# Patient Record
Sex: FEMALE | Race: WHITE | Employment: OTHER | URBAN - METROPOLITAN AREA
[De-identification: names, ages, dates, MRNs, and addresses within clinical notes are randomized per-mention and may not be internally consistent; named-entity substitution may affect disease eponyms.]

---

## 2021-05-02 ENCOUNTER — HOSPITAL ENCOUNTER (EMERGENCY)
Age: 86
Discharge: HOME | End: 2021-05-02
Attending: EMERGENCY MEDICINE
Payer: MEDICARE

## 2021-05-02 ENCOUNTER — APPOINTMENT (OUTPATIENT)
Dept: GENERAL RADIOLOGY | Age: 86
End: 2021-05-02
Attending: PHYSICIAN ASSISTANT
Payer: MEDICARE

## 2021-05-02 VITALS
WEIGHT: 154 LBS | HEART RATE: 78 BPM | TEMPERATURE: 98.6 F | OXYGEN SATURATION: 98 % | SYSTOLIC BLOOD PRESSURE: 134 MMHG | BODY MASS INDEX: 22.81 KG/M2 | HEIGHT: 69 IN | RESPIRATION RATE: 16 BRPM | DIASTOLIC BLOOD PRESSURE: 78 MMHG

## 2021-05-02 DIAGNOSIS — S82.851A CLOSED TRIMALLEOLAR FRACTURE OF RIGHT ANKLE, INITIAL ENCOUNTER: Primary | ICD-10-CM

## 2021-05-02 PROCEDURE — 75810000053 HC SPLINT APPLICATION

## 2021-05-02 PROCEDURE — 99285 EMERGENCY DEPT VISIT HI MDM: CPT

## 2021-05-02 PROCEDURE — 2709999900 HC NON-CHARGEABLE SUPPLY

## 2021-05-02 PROCEDURE — 73610 X-RAY EXAM OF ANKLE: CPT

## 2021-05-02 RX ORDER — ASPIRIN 81 MG/1
81 TABLET ORAL DAILY
COMMUNITY

## 2021-05-02 RX ORDER — TRAMADOL HYDROCHLORIDE 50 MG/1
50 TABLET ORAL
Qty: 12 TAB | Refills: 0 | Status: SHIPPED | OUTPATIENT
Start: 2021-05-02 | End: 2021-05-05

## 2021-05-02 RX ORDER — LISINOPRIL 10 MG/1
2.5 TABLET ORAL DAILY
COMMUNITY

## 2021-05-02 NOTE — ED PROVIDER NOTES
EMERGENCY DEPARTMENT HISTORY AND PHYSICAL EXAM    Date: 2021  Patient Name: Meryle Nett    History of Presenting Illness     Chief Complaint   Patient presents with    Fall    Ankle Injury         History Provided By: Patient    Chief Complaint: ankle injury         Additional History (Context):   12:34 PM  Meryle Nett is a 80 y.o. female presents to the emergency department C/O right ankle injury after she tripped down one step. She did not hit her head. She is having right ankle swelling. No other injuries during the fall. PCP: Anna Vickers MD        Past History     Past Medical History:  Past Medical History:   Diagnosis Date    Hypertension        Past Surgical History:  Past Surgical History:   Procedure Laterality Date    HX APPENDECTOMY  1950    HX CHOLECYSTECTOMY  200       Family History:  History reviewed. No pertinent family history. Social History:  Social History     Tobacco Use    Smoking status: Former Smoker     Quit date: 2000     Years since quittin.3    Smokeless tobacco: Never Used   Substance Use Topics    Alcohol use: Never     Frequency: Never    Drug use: Never       Allergies: Allergies   Allergen Reactions    Penicillins Unknown (comments)       Review of Systems   Review of Systems   Constitutional: Negative for chills and fever. Respiratory: Negative for shortness of breath. Cardiovascular: Negative for chest pain. Gastrointestinal: Negative for abdominal pain, diarrhea, nausea and vomiting. Musculoskeletal: Positive for arthralgias (right ankle). Neurological: Negative for weakness and numbness. All other systems reviewed and are negative. Physical Exam     Vitals:    21 1237 21 1335   BP: (!) 153/80    Pulse: 80    Resp: 18    Temp: 98 °F (36.7 °C)    SpO2: 98% 98%   Weight: 69.9 kg (154 lb)    Height: 5' 9\" (1.753 m)      Physical Exam  Vitals signs and nursing note reviewed.    Constitutional: Appearance: She is well-developed. HENT:      Head: Normocephalic and atraumatic. Neck:      Musculoskeletal: Normal range of motion and neck supple. Cardiovascular:      Rate and Rhythm: Normal rate and regular rhythm. Heart sounds: Normal heart sounds. No murmur. Pulmonary:      Effort: Pulmonary effort is normal. No respiratory distress. Breath sounds: Normal breath sounds. No wheezing or rales. Abdominal:      General: Bowel sounds are normal.      Palpations: Abdomen is soft. Tenderness: There is no abdominal tenderness. Musculoskeletal:      Right ankle: She exhibits decreased range of motion, swelling and ecchymosis. She exhibits no deformity. Tenderness. Lateral malleolus and medial malleolus tenderness found. No head of 5th metatarsal and no proximal fibula tenderness found. Comments: Swelling tenderness and bruising to the right ankle, right foot nontender to palpation, pulses 2+ for DP and PT, no tenderness along the proximal fibula   Neurological:      Mental Status: She is alert and oriented to person, place, and time. Psychiatric:         Judgment: Judgment normal.         Diagnostic Study Results     Labs:   No results found for this or any previous visit (from the past 12 hour(s)). Radiologic Studies:   XR ANKLE RT MIN 3 V   Final Result      1. Fractures of the medial malleolus and distal fibula, likely a trimalleolar   fracture. CT Results  (Last 48 hours)    None        CXR Results  (Last 48 hours)    None          Medical Decision Making   I am the first provider for this patient. I reviewed the vital signs, available nursing notes, past medical history, past surgical history, family history and social history. Vital Signs: Reviewed the patient's vital signs. Pulse Oximetry Analysis: 98% on RA     Records Reviewed: Nursing Notes and Old Medical Records    Procedures:  Procedures    ED Course:   12:34 PM Initial assessment performed.  The patients presenting problems have been discussed, and they are in agreement with the care plan formulated and outlined with them. I have encouraged them to ask questions as they arise throughout their visit.    2:02 PM  Case discussed with ortho, Dr. Roz Alfonso, recommends posterior and stirrup splint and follow-up with him in office    Patient will return to assisted living where they will let her use a wheelchair and prescription was written to have PT fit patient for wheelchair    Discussion:  Pt presents with right ankle pain after a fall. Patient is neurovascularly intact. No other injuries from the fall. No head injury. Trimalleolar fracture seen on x-ray. Patient placed in anterior and stirrup splint and given wheelchair told to follow-up with Ortho. Strict return precautions given, pt offering no questions or complaints. Diagnosis and Disposition     DISCHARGE NOTE:  Nasrin Hoskins  results have been reviewed with her. She has been counseled regarding her diagnosis, treatment, and plan. She verbally conveys understanding and agreement of the signs, symptoms, diagnosis, treatment and prognosis and additionally agrees to follow up as discussed. She also agrees with the care-plan and conveys that all of her questions have been answered. I have also provided discharge instructions for her that include: educational information regarding their diagnosis and treatment, and list of reasons why they would want to return to the ED prior to their follow-up appointment, should her condition change. She has been provided with education for proper emergency department utilization. CLINICAL IMPRESSION:    1. Closed trimalleolar fracture of right ankle, initial encounter        PLAN:  1. D/C Home  2. Current Discharge Medication List      START taking these medications    Details   traMADoL (Ultram) 50 mg tablet Take 1 Tab by mouth every six (6) hours as needed for Pain for up to 3 days.  Max Daily Amount: 200 mg. Qty: 12 Tab, Refills: 0    Associated Diagnoses: Closed trimalleolar fracture of right ankle, initial encounter      Wheel Chair joel Please have PT evaluate for wheelchair, no weightbearing until evaluated by Ortho  Qty: 1 Each, Refills: 0           3. Follow-up Information     Follow up With Specialties Details Why Contact Info    Latesha Hassan MD Orthopedic Surgery Schedule an appointment as soon as possible for a visit   Archana        [facility] EMERGENCY DEPT Emergency Medicine  If symptoms worsen [address] [place] [id number] [phone]               Please note that this dictation was completed with [employer], the computer voice recognition software. Quite often unanticipated grammatical, syntax, homophones, and other interpretive errors are inadvertently transcribed by the computer software. Please disregard these errors. Please excuse any errors that have escaped final proofreading.

## 2021-05-02 NOTE — ED TRIAGE NOTES
Pt arrives via ambulance c/o trip and fall down 1 step. Pt c/o R ankle pain s/p fall. Pt denies any syncope or other complaints. Pt has swelling to R ankle. + pulses limited ROM due to pain.

## 2021-05-06 ENCOUNTER — TRANSCRIBE ORDER (OUTPATIENT)
Dept: REGISTRATION | Age: 86
End: 2021-05-06

## 2021-05-06 ENCOUNTER — HOSPITAL ENCOUNTER (OUTPATIENT)
Dept: PREADMISSION TESTING | Age: 86
Discharge: HOME | End: 2021-05-06
Payer: MEDICARE

## 2021-05-06 DIAGNOSIS — S82.851A CLOSED DISPLACED TRIMALLEOLAR FRACTURE OF RIGHT ANKLE: Primary | ICD-10-CM

## 2021-05-06 DIAGNOSIS — S82.851A CLOSED DISPLACED TRIMALLEOLAR FRACTURE OF RIGHT ANKLE: ICD-10-CM

## 2021-05-06 LAB
ALBUMIN SERPL-MCNC: 3.5 G/DL (ref 3.4–5)
ALBUMIN/GLOB SERPL: 1 {RATIO} (ref 0.8–1.7)
ALP SERPL-CCNC: 82 U/L (ref 45–117)
ALT SERPL-CCNC: 18 U/L (ref 13–56)
ANION GAP SERPL CALC-SCNC: 3 MMOL/L (ref 3–18)
AST SERPL-CCNC: 17 U/L (ref 10–38)
ATRIAL RATE: 68 BPM
BILIRUB SERPL-MCNC: 0.6 MG/DL (ref 0.2–1)
BUN SERPL-MCNC: 25 MG/DL (ref 7–18)
BUN/CREAT SERPL: 22 (ref 12–20)
CALCIUM SERPL-MCNC: 8.6 MG/DL (ref 8.5–10.1)
CALCULATED P AXIS, ECG09: 58 DEGREES
CALCULATED R AXIS, ECG10: -35 DEGREES
CALCULATED T AXIS, ECG11: 54 DEGREES
CHLORIDE SERPL-SCNC: 103 MMOL/L (ref 100–111)
CO2 SERPL-SCNC: 31 MMOL/L (ref 21–32)
CREAT SERPL-MCNC: 1.16 MG/DL (ref 0.6–1.3)
DIAGNOSIS, 93000: NORMAL
ERYTHROCYTE [DISTWIDTH] IN BLOOD BY AUTOMATED COUNT: 12.8 % (ref 11.6–14.5)
GLOBULIN SER CALC-MCNC: 3.5 G/DL (ref 2–4)
GLUCOSE SERPL-MCNC: 93 MG/DL (ref 74–99)
HCT VFR BLD AUTO: 37.2 % (ref 35–45)
HGB BLD-MCNC: 12.2 G/DL (ref 12–16)
MCH RBC QN AUTO: 30.1 PG (ref 24–34)
MCHC RBC AUTO-ENTMCNC: 32.8 G/DL (ref 31–37)
MCV RBC AUTO: 91.9 FL (ref 74–97)
P-R INTERVAL, ECG05: 196 MS
PLATELET # BLD AUTO: 238 K/UL (ref 135–420)
PMV BLD AUTO: 10.7 FL (ref 9.2–11.8)
POTASSIUM SERPL-SCNC: 3.7 MMOL/L (ref 3.5–5.5)
PROT SERPL-MCNC: 7 G/DL (ref 6.4–8.2)
Q-T INTERVAL, ECG07: 412 MS
QRS DURATION, ECG06: 110 MS
QTC CALCULATION (BEZET), ECG08: 438 MS
RBC # BLD AUTO: 4.05 M/UL (ref 4.2–5.3)
SODIUM SERPL-SCNC: 137 MMOL/L (ref 136–145)
VENTRICULAR RATE, ECG03: 68 BPM
WBC # BLD AUTO: 8.7 K/UL (ref 4.6–13.2)

## 2021-05-06 PROCEDURE — 85027 COMPLETE CBC AUTOMATED: CPT

## 2021-05-06 PROCEDURE — 83036 HEMOGLOBIN GLYCOSYLATED A1C: CPT

## 2021-05-06 PROCEDURE — 80053 COMPREHEN METABOLIC PANEL: CPT

## 2021-05-06 PROCEDURE — 93005 ELECTROCARDIOGRAM TRACING: CPT

## 2021-05-06 PROCEDURE — 36415 COLL VENOUS BLD VENIPUNCTURE: CPT

## 2021-05-11 LAB
EST. AVERAGE GLUCOSE BLD GHB EST-MCNC: 123 MG/DL
HBA1C MFR BLD: 5.9 % (ref 4.2–5.6)

## 2021-05-13 ENCOUNTER — ANESTHESIA EVENT (OUTPATIENT)
Dept: SURGERY | Age: 86
End: 2021-05-13
Payer: MEDICARE

## 2021-05-13 RX ORDER — INSULIN LISPRO 100 [IU]/ML
INJECTION, SOLUTION INTRAVENOUS; SUBCUTANEOUS ONCE
Status: CANCELLED | OUTPATIENT
Start: 2021-05-13 | End: 2021-05-14

## 2021-05-14 ENCOUNTER — APPOINTMENT (OUTPATIENT)
Dept: GENERAL RADIOLOGY | Age: 86
End: 2021-05-14
Attending: ORTHOPAEDIC SURGERY
Payer: MEDICARE

## 2021-05-14 ENCOUNTER — HOSPITAL ENCOUNTER (OUTPATIENT)
Age: 86
Discharge: HOME HEALTH | End: 2021-05-15
Attending: ORTHOPAEDIC SURGERY | Admitting: ORTHOPAEDIC SURGERY
Payer: MEDICARE

## 2021-05-14 ENCOUNTER — ANESTHESIA (OUTPATIENT)
Dept: SURGERY | Age: 86
End: 2021-05-14
Payer: MEDICARE

## 2021-05-14 DIAGNOSIS — S82.851A CLOSED TRIMALLEOLAR FRACTURE OF RIGHT ANKLE, INITIAL ENCOUNTER: Primary | ICD-10-CM

## 2021-05-14 LAB
GLUCOSE BLD STRIP.AUTO-MCNC: 132 MG/DL (ref 70–110)
GLUCOSE BLD STRIP.AUTO-MCNC: 98 MG/DL (ref 70–110)

## 2021-05-14 PROCEDURE — C1713 ANCHOR/SCREW BN/BN,TIS/BN: HCPCS | Performed by: ORTHOPAEDIC SURGERY

## 2021-05-14 PROCEDURE — 77030013079 HC BLNKT BAIR HGGR 3M -A: Performed by: ANESTHESIOLOGY

## 2021-05-14 PROCEDURE — 74011250636 HC RX REV CODE- 250/636: Performed by: ANESTHESIOLOGY

## 2021-05-14 PROCEDURE — 82962 GLUCOSE BLOOD TEST: CPT

## 2021-05-14 PROCEDURE — 76942 ECHO GUIDE FOR BIOPSY: CPT | Performed by: ORTHOPAEDIC SURGERY

## 2021-05-14 PROCEDURE — 74011000250 HC RX REV CODE- 250: Performed by: REGISTERED NURSE

## 2021-05-14 PROCEDURE — 76060000033 HC ANESTHESIA 1 TO 1.5 HR: Performed by: ORTHOPAEDIC SURGERY

## 2021-05-14 PROCEDURE — 77030020268 HC MISC GENERAL SUPPLY: Performed by: ORTHOPAEDIC SURGERY

## 2021-05-14 PROCEDURE — 64450 NJX AA&/STRD OTHER PN/BRANCH: CPT | Performed by: ANESTHESIOLOGY

## 2021-05-14 PROCEDURE — 74011250636 HC RX REV CODE- 250/636: Performed by: ORTHOPAEDIC SURGERY

## 2021-05-14 PROCEDURE — 74011000250 HC RX REV CODE- 250: Performed by: ORTHOPAEDIC SURGERY

## 2021-05-14 PROCEDURE — 77030012508 HC MSK AIRWY LMA AMBU -A: Performed by: ANESTHESIOLOGY

## 2021-05-14 PROCEDURE — 74011250637 HC RX REV CODE- 250/637: Performed by: ORTHOPAEDIC SURGERY

## 2021-05-14 PROCEDURE — 77030040361 HC SLV COMPR DVT MDII -B: Performed by: ORTHOPAEDIC SURGERY

## 2021-05-14 PROCEDURE — 77030000032 HC CUF TRNQT ZIMM -B: Performed by: ORTHOPAEDIC SURGERY

## 2021-05-14 PROCEDURE — 77030020269 HC MISC IMPL: Performed by: ORTHOPAEDIC SURGERY

## 2021-05-14 PROCEDURE — 77030002916 HC SUT ETHLN J&J -A: Performed by: ORTHOPAEDIC SURGERY

## 2021-05-14 PROCEDURE — 76210000006 HC OR PH I REC 0.5 TO 1 HR: Performed by: ORTHOPAEDIC SURGERY

## 2021-05-14 PROCEDURE — 77030016060 HC NDL NRV BLK TELE -A: Performed by: ANESTHESIOLOGY

## 2021-05-14 PROCEDURE — 77030020782 HC GWN BAIR PAWS FLX 3M -B: Performed by: ORTHOPAEDIC SURGERY

## 2021-05-14 PROCEDURE — 77030031139 HC SUT VCRL2 J&J -A: Performed by: ORTHOPAEDIC SURGERY

## 2021-05-14 PROCEDURE — 64447 NJX AA&/STRD FEMORAL NRV IMG: CPT | Performed by: ANESTHESIOLOGY

## 2021-05-14 PROCEDURE — 2709999900 HC NON-CHARGEABLE SUPPLY: Performed by: ORTHOPAEDIC SURGERY

## 2021-05-14 PROCEDURE — 74011250636 HC RX REV CODE- 250/636: Performed by: REGISTERED NURSE

## 2021-05-14 PROCEDURE — 74011000250 HC RX REV CODE- 250: Performed by: ANESTHESIOLOGY

## 2021-05-14 PROCEDURE — 77030003748: Performed by: ORTHOPAEDIC SURGERY

## 2021-05-14 PROCEDURE — 64445 NJX AA&/STRD SCIATIC NRV IMG: CPT | Performed by: ANESTHESIOLOGY

## 2021-05-14 PROCEDURE — 76010000149 HC OR TIME 1 TO 1.5 HR: Performed by: ORTHOPAEDIC SURGERY

## 2021-05-14 DEVICE — SCREW BNE L16MM DIA2.7MM CORT ANK S STL NONLOCKING LO PROF: Type: IMPLANTABLE DEVICE | Site: ANKLE | Status: FUNCTIONAL

## 2021-05-14 DEVICE — SCREW BNE L40MM DIA4MM CANC FT ANK S STL ST CANN NONLOCKING: Type: IMPLANTABLE DEVICE | Site: ANKLE | Status: FUNCTIONAL

## 2021-05-14 DEVICE — IMPLANTABLE DEVICE: Type: IMPLANTABLE DEVICE | Site: ANKLE | Status: FUNCTIONAL

## 2021-05-14 RX ORDER — GLYCOPYRROLATE 0.2 MG/ML
INJECTION INTRAMUSCULAR; INTRAVENOUS AS NEEDED
Status: DISCONTINUED | OUTPATIENT
Start: 2021-05-14 | End: 2021-05-14 | Stop reason: HOSPADM

## 2021-05-14 RX ORDER — GLYCOPYRROLATE 0.2 MG/ML
INJECTION INTRAMUSCULAR; INTRAVENOUS AS NEEDED
Status: DISCONTINUED | OUTPATIENT
Start: 2021-05-14 | End: 2021-05-14

## 2021-05-14 RX ORDER — DEXTROSE 50 % IN WATER (D50W) INTRAVENOUS SYRINGE
25-50 AS NEEDED
Status: DISCONTINUED | OUTPATIENT
Start: 2021-05-14 | End: 2021-05-14 | Stop reason: SDUPTHER

## 2021-05-14 RX ORDER — CLINDAMYCIN PHOSPHATE 900 MG/50ML
900 INJECTION, SOLUTION INTRAVENOUS
Status: COMPLETED | OUTPATIENT
Start: 2021-05-14 | End: 2021-05-14

## 2021-05-14 RX ORDER — OXYCODONE AND ACETAMINOPHEN 5; 325 MG/1; MG/1
1 TABLET ORAL
Status: DISCONTINUED | OUTPATIENT
Start: 2021-05-14 | End: 2021-05-15 | Stop reason: HOSPADM

## 2021-05-14 RX ORDER — SODIUM CHLORIDE, SODIUM LACTATE, POTASSIUM CHLORIDE, CALCIUM CHLORIDE 600; 310; 30; 20 MG/100ML; MG/100ML; MG/100ML; MG/100ML
125 INJECTION, SOLUTION INTRAVENOUS CONTINUOUS
Status: DISCONTINUED | OUTPATIENT
Start: 2021-05-14 | End: 2021-05-14 | Stop reason: SDUPTHER

## 2021-05-14 RX ORDER — FENTANYL CITRATE 50 UG/ML
25 INJECTION, SOLUTION INTRAMUSCULAR; INTRAVENOUS AS NEEDED
Status: DISCONTINUED | OUTPATIENT
Start: 2021-05-14 | End: 2021-05-14 | Stop reason: HOSPADM

## 2021-05-14 RX ORDER — NALOXONE HYDROCHLORIDE 0.4 MG/ML
0.4 INJECTION, SOLUTION INTRAMUSCULAR; INTRAVENOUS; SUBCUTANEOUS AS NEEDED
Status: DISCONTINUED | OUTPATIENT
Start: 2021-05-14 | End: 2021-05-15 | Stop reason: HOSPADM

## 2021-05-14 RX ORDER — CLINDAMYCIN PHOSPHATE 900 MG/50ML
900 INJECTION, SOLUTION INTRAVENOUS ONCE
Status: DISPENSED | OUTPATIENT
Start: 2021-05-14 | End: 2021-05-14

## 2021-05-14 RX ORDER — SODIUM CHLORIDE, SODIUM LACTATE, POTASSIUM CHLORIDE, CALCIUM CHLORIDE 600; 310; 30; 20 MG/100ML; MG/100ML; MG/100ML; MG/100ML
125 INJECTION, SOLUTION INTRAVENOUS CONTINUOUS
Status: DISCONTINUED | OUTPATIENT
Start: 2021-05-14 | End: 2021-05-14 | Stop reason: HOSPADM

## 2021-05-14 RX ORDER — OXYCODONE HYDROCHLORIDE 5 MG/1
5 TABLET ORAL AS NEEDED
Status: DISCONTINUED | OUTPATIENT
Start: 2021-05-14 | End: 2021-05-14 | Stop reason: HOSPADM

## 2021-05-14 RX ORDER — SODIUM CHLORIDE 0.9 % (FLUSH) 0.9 %
5-40 SYRINGE (ML) INJECTION EVERY 8 HOURS
Status: DISCONTINUED | OUTPATIENT
Start: 2021-05-14 | End: 2021-05-14 | Stop reason: HOSPADM

## 2021-05-14 RX ORDER — HYDROCODONE BITARTRATE AND ACETAMINOPHEN 5; 325 MG/1; MG/1
1 TABLET ORAL
Qty: 15 TAB | Refills: 0 | Status: SHIPPED | OUTPATIENT
Start: 2021-05-14 | End: 2021-05-19

## 2021-05-14 RX ORDER — ROPIVACAINE HYDROCHLORIDE 5 MG/ML
INJECTION, SOLUTION EPIDURAL; INFILTRATION; PERINEURAL
Status: COMPLETED | OUTPATIENT
Start: 2021-05-14 | End: 2021-05-14

## 2021-05-14 RX ORDER — INSULIN LISPRO 100 [IU]/ML
INJECTION, SOLUTION INTRAVENOUS; SUBCUTANEOUS ONCE
Status: DISCONTINUED | OUTPATIENT
Start: 2021-05-14 | End: 2021-05-14 | Stop reason: HOSPADM

## 2021-05-14 RX ORDER — CLINDAMYCIN PHOSPHATE 900 MG/50ML
900 INJECTION, SOLUTION INTRAVENOUS ONCE
Status: DISCONTINUED | OUTPATIENT
Start: 2021-05-14 | End: 2021-05-14

## 2021-05-14 RX ORDER — HYDROMORPHONE HYDROCHLORIDE 1 MG/ML
0.5 INJECTION, SOLUTION INTRAMUSCULAR; INTRAVENOUS; SUBCUTANEOUS
Status: DISCONTINUED | OUTPATIENT
Start: 2021-05-14 | End: 2021-05-14 | Stop reason: HOSPADM

## 2021-05-14 RX ORDER — FAMOTIDINE 20 MG/1
40 TABLET, FILM COATED ORAL DAILY
Status: DISCONTINUED | OUTPATIENT
Start: 2021-05-15 | End: 2021-05-15

## 2021-05-14 RX ORDER — ACETAMINOPHEN 325 MG/1
650 TABLET ORAL
Status: DISCONTINUED | OUTPATIENT
Start: 2021-05-14 | End: 2021-05-15 | Stop reason: HOSPADM

## 2021-05-14 RX ORDER — FERROUS SULFATE, DRIED 160(50) MG
1 TABLET, EXTENDED RELEASE ORAL DAILY
Status: DISCONTINUED | OUTPATIENT
Start: 2021-05-15 | End: 2021-05-15 | Stop reason: HOSPADM

## 2021-05-14 RX ORDER — AMOXICILLIN 250 MG
1 CAPSULE ORAL 2 TIMES DAILY
Status: DISCONTINUED | OUTPATIENT
Start: 2021-05-14 | End: 2021-05-15 | Stop reason: HOSPADM

## 2021-05-14 RX ORDER — METOCLOPRAMIDE HYDROCHLORIDE 5 MG/ML
INJECTION INTRAMUSCULAR; INTRAVENOUS AS NEEDED
Status: DISCONTINUED | OUTPATIENT
Start: 2021-05-14 | End: 2021-05-14 | Stop reason: HOSPADM

## 2021-05-14 RX ORDER — SODIUM CHLORIDE 0.9 % (FLUSH) 0.9 %
5-40 SYRINGE (ML) INJECTION AS NEEDED
Status: DISCONTINUED | OUTPATIENT
Start: 2021-05-14 | End: 2021-05-14 | Stop reason: HOSPADM

## 2021-05-14 RX ORDER — ATORVASTATIN CALCIUM 20 MG/1
20 TABLET, FILM COATED ORAL
Status: DISCONTINUED | OUTPATIENT
Start: 2021-05-14 | End: 2021-05-15 | Stop reason: HOSPADM

## 2021-05-14 RX ORDER — FACIAL-BODY WIPES
10 EACH TOPICAL DAILY PRN
Status: DISCONTINUED | OUTPATIENT
Start: 2021-05-14 | End: 2021-05-15 | Stop reason: HOSPADM

## 2021-05-14 RX ORDER — ONDANSETRON 4 MG/1
4 TABLET, ORALLY DISINTEGRATING ORAL
Status: DISCONTINUED | OUTPATIENT
Start: 2021-05-14 | End: 2021-05-15 | Stop reason: HOSPADM

## 2021-05-14 RX ORDER — SODIUM CHLORIDE 0.9 % (FLUSH) 0.9 %
5-40 SYRINGE (ML) INJECTION AS NEEDED
Status: DISCONTINUED | OUTPATIENT
Start: 2021-05-14 | End: 2021-05-15 | Stop reason: HOSPADM

## 2021-05-14 RX ORDER — LISINOPRIL 5 MG/1
2.5 TABLET ORAL DAILY
Status: DISCONTINUED | OUTPATIENT
Start: 2021-05-15 | End: 2021-05-15 | Stop reason: HOSPADM

## 2021-05-14 RX ORDER — ONDANSETRON 2 MG/ML
INJECTION INTRAMUSCULAR; INTRAVENOUS AS NEEDED
Status: DISCONTINUED | OUTPATIENT
Start: 2021-05-14 | End: 2021-05-14 | Stop reason: HOSPADM

## 2021-05-14 RX ORDER — MIDAZOLAM HYDROCHLORIDE 1 MG/ML
INJECTION, SOLUTION INTRAMUSCULAR; INTRAVENOUS AS NEEDED
Status: DISCONTINUED | OUTPATIENT
Start: 2021-05-14 | End: 2021-05-14 | Stop reason: HOSPADM

## 2021-05-14 RX ORDER — MAGNESIUM SULFATE 100 %
4 CRYSTALS MISCELLANEOUS AS NEEDED
Status: DISCONTINUED | OUTPATIENT
Start: 2021-05-14 | End: 2021-05-14 | Stop reason: HOSPADM

## 2021-05-14 RX ORDER — LEVOTHYROXINE SODIUM 100 UG/1
100 TABLET ORAL
Status: DISCONTINUED | OUTPATIENT
Start: 2021-05-15 | End: 2021-05-15 | Stop reason: HOSPADM

## 2021-05-14 RX ORDER — CLINDAMYCIN PHOSPHATE 900 MG/50ML
900 INJECTION, SOLUTION INTRAVENOUS EVERY 8 HOURS
Status: COMPLETED | OUTPATIENT
Start: 2021-05-14 | End: 2021-05-15

## 2021-05-14 RX ORDER — MAGNESIUM SULFATE 100 %
4 CRYSTALS MISCELLANEOUS AS NEEDED
Status: DISCONTINUED | OUTPATIENT
Start: 2021-05-14 | End: 2021-05-14 | Stop reason: SDUPTHER

## 2021-05-14 RX ORDER — BUPIVACAINE HYDROCHLORIDE 5 MG/ML
INJECTION, SOLUTION EPIDURAL; INTRACAUDAL AS NEEDED
Status: DISCONTINUED | OUTPATIENT
Start: 2021-05-14 | End: 2021-05-14 | Stop reason: HOSPADM

## 2021-05-14 RX ORDER — DEXTROSE 50 % IN WATER (D50W) INTRAVENOUS SYRINGE
25-50 AS NEEDED
Status: DISCONTINUED | OUTPATIENT
Start: 2021-05-14 | End: 2021-05-14 | Stop reason: HOSPADM

## 2021-05-14 RX ORDER — HYDROMORPHONE HYDROCHLORIDE 1 MG/ML
1 INJECTION, SOLUTION INTRAMUSCULAR; INTRAVENOUS; SUBCUTANEOUS
Status: DISCONTINUED | OUTPATIENT
Start: 2021-05-14 | End: 2021-05-15 | Stop reason: HOSPADM

## 2021-05-14 RX ORDER — SODIUM CHLORIDE 0.9 % (FLUSH) 0.9 %
5-40 SYRINGE (ML) INJECTION EVERY 8 HOURS
Status: DISCONTINUED | OUTPATIENT
Start: 2021-05-14 | End: 2021-05-15 | Stop reason: HOSPADM

## 2021-05-14 RX ORDER — LIDOCAINE HYDROCHLORIDE 20 MG/ML
INJECTION, SOLUTION EPIDURAL; INFILTRATION; INTRACAUDAL; PERINEURAL AS NEEDED
Status: DISCONTINUED | OUTPATIENT
Start: 2021-05-14 | End: 2021-05-14 | Stop reason: HOSPADM

## 2021-05-14 RX ORDER — PROPOFOL 10 MG/ML
INJECTION, EMULSION INTRAVENOUS AS NEEDED
Status: DISCONTINUED | OUTPATIENT
Start: 2021-05-14 | End: 2021-05-14 | Stop reason: HOSPADM

## 2021-05-14 RX ORDER — ASPIRIN 81 MG/1
81 TABLET ORAL DAILY
Status: DISCONTINUED | OUTPATIENT
Start: 2021-05-15 | End: 2021-05-15 | Stop reason: HOSPADM

## 2021-05-14 RX ORDER — FENTANYL CITRATE 50 UG/ML
INJECTION, SOLUTION INTRAMUSCULAR; INTRAVENOUS AS NEEDED
Status: DISCONTINUED | OUTPATIENT
Start: 2021-05-14 | End: 2021-05-14 | Stop reason: SDUPTHER

## 2021-05-14 RX ADMIN — CLINDAMYCIN PHOSPHATE 900 MG: 900 INJECTION, SOLUTION INTRAVENOUS at 20:54

## 2021-05-14 RX ADMIN — GLYCOPYRROLATE 0.2 MG: 0.2 INJECTION INTRAMUSCULAR; INTRAVENOUS at 11:49

## 2021-05-14 RX ADMIN — Medication 10 ML: at 15:28

## 2021-05-14 RX ADMIN — LIDOCAINE HYDROCHLORIDE 60 MG: 20 INJECTION, SOLUTION EPIDURAL; INFILTRATION; INTRACAUDAL; PERINEURAL at 11:20

## 2021-05-14 RX ADMIN — FENTANYL CITRATE 50 MCG: 50 INJECTION, SOLUTION INTRAMUSCULAR; INTRAVENOUS at 10:10

## 2021-05-14 RX ADMIN — ONDANSETRON HYDROCHLORIDE 4 MG: 2 INJECTION INTRAMUSCULAR; INTRAVENOUS at 11:46

## 2021-05-14 RX ADMIN — ROPIVACAINE HYDROCHLORIDE 25 ML: 5 INJECTION, SOLUTION EPIDURAL; INFILTRATION; PERINEURAL at 10:19

## 2021-05-14 RX ADMIN — METOCLOPRAMIDE 5 MG: 5 INJECTION, SOLUTION INTRAMUSCULAR; INTRAVENOUS at 11:50

## 2021-05-14 RX ADMIN — FENTANYL CITRATE 50 MCG: 50 INJECTION, SOLUTION INTRAMUSCULAR; INTRAVENOUS at 11:11

## 2021-05-14 RX ADMIN — DOCUSATE SODIUM 50 MG AND SENNOSIDES 8.6 MG 1 TABLET: 8.6; 5 TABLET, FILM COATED ORAL at 20:53

## 2021-05-14 RX ADMIN — CLINDAMYCIN PHOSPHATE 900 MG: 900 INJECTION, SOLUTION INTRAVENOUS at 11:33

## 2021-05-14 RX ADMIN — SODIUM CHLORIDE, SODIUM LACTATE, POTASSIUM CHLORIDE, AND CALCIUM CHLORIDE 125 ML/HR: 600; 310; 30; 20 INJECTION, SOLUTION INTRAVENOUS at 09:32

## 2021-05-14 RX ADMIN — Medication 10 ML: at 21:02

## 2021-05-14 RX ADMIN — ATORVASTATIN CALCIUM 20 MG: 20 TABLET, FILM COATED ORAL at 21:01

## 2021-05-14 RX ADMIN — SODIUM CHLORIDE, SODIUM LACTATE, POTASSIUM CHLORIDE, AND CALCIUM CHLORIDE: 600; 310; 30; 20 INJECTION, SOLUTION INTRAVENOUS at 11:50

## 2021-05-14 RX ADMIN — PROPOFOL 120 MG: 10 INJECTION, EMULSION INTRAVENOUS at 11:20

## 2021-05-14 RX ADMIN — MIDAZOLAM 2 MG: 1 INJECTION INTRAMUSCULAR; INTRAVENOUS at 10:10

## 2021-05-14 RX ADMIN — ROPIVACAINE HYDROCHLORIDE 25 ML: 5 INJECTION, SOLUTION EPIDURAL; INFILTRATION; PERINEURAL at 10:15

## 2021-05-14 NOTE — PROGRESS NOTES
Pt transferred to room 330. Pt stable. Dressing CDI. Valeria Osborne, RN at bedside.         05/14/21 1422   Vitals   Temp 97.7 °F (36.5 °C)   Temp Source Oral   Pulse (Heart Rate) 79   Resp Rate 16   O2 Sat (%) 99 %   Level of Consciousness Alert (0)   BP (!) 153/63   MAP (Calculated) 93   MEWS Score 1   Pain 1   Pain Scale 1 Numeric (0 - 10)   Pain Intensity 1 0

## 2021-05-14 NOTE — BRIEF OP NOTE
Brief Postoperative Note    Patient: Swati Wallace  YOB: 1934  MRN: [id number]    Date of Procedure: 5/14/2021     Pre-Op Diagnosis: RIGHT TRIMALLEOLAR ANKLE FRACTURE    Post-Op Diagnosis: Same as preoperative diagnosis. Procedure(s):  RIGHT ANKLE TIRMALLEOLAR ANKLE FRACTURE, OPEN REDUCTION THE FIBULA AND MEDIAL MALLEOLUS WITH C-ARM ([address])    Surgeon(s):  Luzmaria Plascencia MD    Surgical Assistant: Surg Asst-1: Court Abarca    Anesthesia: General     Estimated Blood Loss (mL): less than 858     Complications: None    Specimens: * No specimens in log *     Implants:   Implant Name Type Inv.  Item Serial No.  Lot No. LRB No. Used Action   [employer] FIBULOCK NAIL 3.0 X 130MM, RIGHT    [employer] [id number] Right 1 Implanted   SCREW BNE L16MM DIA2.7MM TREASURE ANK S STL NONLOCKING LO PROF - [id number]  SCREW BNE L16MM DIA2.7MM TREASURE ANK S STL NONLOCKING LO PROF  [employer]WD 000 Right 3 Implanted   SCREW BNE L48MM DIA3.5MM TREASURE ANK S STL NONLOCKING FULL - [id number]  SCREW BNE L48MM DIA3.5MM TREASURE ANK S STL NONLOCKING FULL  [employer]WD 000 Right 1 Implanted   SCREW BNE L40MM DIA4MM CANC FT ANK S STL ST ELEANOR NONLOCKING - [id number]  SCREW BNE L40MM DIA4MM CANC FT ANK S STL ST ELEANOR NONLOCKING  [employer]WD 000 Right 2 Implanted   FIBULOCK IMPLANT SYSTEM    [employer] [id number] Right 1 Implanted       Drains: * No LDAs found *    Findings: soft bone    Electronically Signed by Evelia Dumont MD on 5/14/2021 at 12:51 PM

## 2021-05-14 NOTE — H&P
Patient Name:  Basil Mackey   Account #:  [de-identified]  YOB: 1934      Chief Complaint:  Right ankle fracture. History of Chief Complaint:  Ms. Joshua Smith comes in today. She is a new patient to me. She was seen in the emergency room at [facility]. She stays across the street. She was coming out coming out and slipped down the stairs in socks. She had a trimalleolar ankle fracture of the right ankle. She was placed in a splint. She has been non-weightbearing. She follows up for discussion of further treatment. She is allergic to statins as well as penicillin, but she does remember exactly what that was. She has a history of thyroid disease, hypertension, diabetes, and reflux. She is a nonsmoker. 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 She has never had problems with her ankle before this. She is alert and cooperative. She takes an occasional tramadol for some back issues. Past Medical/Surgical History: Allergies:    Ingredient Reaction Medication Name Comment   PENICILLINS          Current Medications:      Social History:    SMOKING  Status Tobacco Type Units Per Day Yrs Used   Never smoker        Family History:      Review of Systems:      Vitals:  Date BP Pulse Temp (F) Resp. (per min.) Height (Total in.) Weight (lbs.) BMI   05/05/2021     64.00 154.00 26.43     Physical Examination:  Examination of her right ankle shows she has significant fracture blisters about the ankle, especially laterally, posteriorly, and medially. There is a hemorrhagic blister on the medial side. She has tenderness of the medial and lateral malleoli. Her toes are pink and have good capillary refill. She has good alignment. Skin is intact, other than the blisters.  No signs of abrasions or lacerations. The splint was changed. Radiograph Examination: AP, mortise, and lateral views of the right ankle were obtained and interpreted in the office today and show a displaced medial malleolus, minimally displaced lateral malleolus, small posterior malleolus fracture. Impression:  Trimalleolar ankle fracture in an otherwise healthy 80year-old. Plan:      We discussed that even though she is 80 she is otherwise quite healthy. We are going to try to move ahead with internal fixation to get her up an moving faster. She will be non-weightbearing. We will schedule her for surgery next week to try to let the blisters come down. Due to her bone density, we will try to use the fibular nail and cannulated screws, and hopefully we can get her putting some weight on this in a couple of weeks and start therapy at that time. We discussed with the patient the risks of infection, persistent pain, and post-traumatic arthritis. At this point, I think her skin is intact enough that we can move ahead with surgery next week and try to get her up and moving a little faster. Apolonia Neves MD/ [facility]

## 2021-05-14 NOTE — PERIOP NOTES
Reviewed PTA medication list with patient/caregiver and patient/caregiver denies any additional medications. Patient admits to having a responsible adult care for them at home for at least 24 hours after surgery. Patient encouraged to use gown warming system and informed that using said warming gown to regulate body temperature prior to a procedure has been shown to help reduce the risks of blood clots and infection. Patient's pharmacy of choice verified and documented in PTA medication section. Dual skin assessment & fall risk band verification completed with Kika Berg RN.

## 2021-05-14 NOTE — PROGRESS NOTES
Occupational Therapy Evaluation Attempt     OT eval order received. Chart reviewed. Attempted Occupational Therapy Evaluation, however, patient unable to be seen due to:  []  Nausea/vomiting  []  Eating  [x]  Pain  []  Patient too lethargic  []  Off Unit for testing/procedure  []  Dialysis treatment in progress  []  Telemetry Results  []  Other:      End of the day attempt. To be followed tomorrow.   Severo Clipper, OTR/L

## 2021-05-14 NOTE — ANESTHESIA POSTPROCEDURE EVALUATION
Post-Anesthesia Evaluation and Assessment    Cardiovascular Function/Vital Signs  Visit Vitals  BP (!) 158/64   Pulse 81   Temp 36.3 °C (97.4 °F)   Resp 17   Ht 5' 5\" (1.651 m)   Wt 76.3 kg (168 lb 2 oz)   SpO2 96%   BMI 27.98 kg/m²       Patient is status post Procedure(s):  RIGHT ANKLE TIRMALLEOLAR ANKLE FRACTURE, OPEN REDUCTION THE FIBULA AND MEDIAL MALLEOLUS WITH C-ARM ([address]). Nausea/Vomiting: Controlled. Postoperative hydration reviewed and adequate. Pain:  Pain Scale 1: Numeric (0 - 10) (05/14/21 1310)  Pain Intensity 1: 0 (05/14/21 1310)   Managed. Neurological Status:   Neuro (WDL): Exceptions to WDL (05/14/21 1310)   At baseline. Mental Status and Level of Consciousness: Baseline and stable. Pulmonary Status:   O2 Device: Nasal cannula (05/14/21 1310)   Adequate oxygenation and airway patent. Complications related to anesthesia: None    Post-anesthesia assessment completed. No concerns. Patient has met all discharge requirements.     Signed By: Yoana Good MD

## 2021-05-14 NOTE — PROGRESS NOTES
[facility]    Post op check    Pt comfortable. Block working well. Dressing clean and dry. Discussed if doing well can be discharge tomorrow morning after physical therapy.     Manisha Bloom MD  [facility]

## 2021-05-14 NOTE — INTERVAL H&P NOTE
Update History & Physical    The Patient's History and Physical was reviewed with the patient and I examined the patient. There was no change. The surgical site was confirmed by the patient and me. Plan:  The risk, benefits, expected outcome, and alternative to the recommended procedure have been discussed with the patient. Patient understands and wants to proceed with the procedure.     Electronically signed by Tamy Franklin MD on 5/14/2021 at 10:51 AM

## 2021-05-14 NOTE — ANESTHESIA PREPROCEDURE EVALUATION
Relevant Problems   No relevant active problems       Anesthetic History   No history of anesthetic complications            Review of Systems / Medical History  Patient summary reviewed, nursing notes reviewed and pertinent labs reviewed    Pulmonary  Within defined limits                 Neuro/Psych   Within defined limits           Cardiovascular    Hypertension              Exercise tolerance: >4 METS     GI/Hepatic/Renal     GERD           Endo/Other    Diabetes  Hypothyroidism       Other Findings              Physical Exam    Airway  Mallampati: III  TM Distance: 4 - 6 cm  Neck ROM: decreased range of motion   Mouth opening: Normal     Cardiovascular  Regular rate and rhythm,  S1 and S2 normal,  no murmur, click, rub, or gallop  Rhythm: regular  Rate: normal         Dental    Dentition: Caps/crowns     Pulmonary  Breath sounds clear to auscultation               Abdominal  GI exam deferred       Other Findings            Anesthetic Plan    ASA: 2  Anesthesia type: general      Post-op pain plan if not by surgeon: peripheral nerve block single      Anesthetic plan and risks discussed with: Patient      Popliteal SNB and ACB explained for postop pain. Risks explained including bleeding, infection, nerve injury, failed block. Procedure explained as elective. Pt understands and desires to proceed.

## 2021-05-14 NOTE — PERIOP NOTES
TRANSFER - OUT REPORT:    Verbal report given to Ele Woodson RN (name) on Amrit Nielsen  being transferred to [address] [place] [address] (unit) for routine post - op       Report consisted of patients Situation, Background, Assessment and   Recommendations(SBAR). Information from the following report(s) SBAR, Kardex, OR Summary, Intake/Output and MAR was reviewed with the receiving nurse. Lines:   Peripheral IV 05/14/21 Posterior;Right Forearm (Active)   Site Assessment Clean, dry, & intact 05/14/21 1310   Phlebitis Assessment 0 05/14/21 1310   Infiltration Assessment 0 05/14/21 1310   Dressing Status Clean, dry, & intact 05/14/21 1310   Dressing Type Transparent;Tape 05/14/21 1310   Hub Color/Line Status Pink; Infusing 05/14/21 1310   Alcohol Cap Used No 05/14/21 0934        Opportunity for questions and clarification was provided.       Patient transported with:   O2 @ 2 liters  Registered Nurse

## 2021-05-14 NOTE — ANESTHESIA PROCEDURE NOTES
Peripheral Block    Start time: 5/14/2021 10:16 AM  End time: 5/14/2021 10:19 AM  Performed by: Rachel Sánchez MD  Authorized by: Rachel Sánchez MD       Pre-procedure: Indications: at surgeon's request and post-op pain management    Preanesthetic Checklist: patient identified, risks and benefits discussed, site marked, timeout performed, anesthesia consent given and patient being monitored    Timeout Time: 10:16          Block Type:   Block Type: Adductor canal  Laterality:  Right  Monitoring:  Standard ASA monitoring, continuous pulse ox, frequent vital sign checks, heart rate, oxygen and responsive to questions  Injection Technique:  Single shot  Procedures: ultrasound guided    Patient Position: supine  Prep: chlorhexidine    Location:  Mid thigh  Needle Type:  Stimuplex  Needle Gauge:  20 G  Needle Localization:  Ultrasound guidance  Medication Injected:  Ropivacaine (PF) (NAROPIN) 5 mg/mL (0.5 %) injection, 25 mL  Med Admin Time: 5/14/2021 10:19 AM    Assessment:  Number of attempts:  1  Injection Assessment:  Incremental injection every 5 mL, negative aspiration for CSF, no paresthesia, ultrasound image on chart, local visualized surrounding nerve on ultrasound, negative aspiration for blood and no intravascular symptoms  Patient tolerance:  Patient tolerated the procedure well with no immediate complications  Patient able to straight leg raise  right foot after block. No sensory or motor block.

## 2021-05-14 NOTE — PERIOP NOTES
Paged Dr. Stephanie Hunter for patient sign out. Patient meets criteria for transfer to the next phase of care.

## 2021-05-15 VITALS
HEIGHT: 66 IN | HEART RATE: 80 BPM | TEMPERATURE: 98.3 F | WEIGHT: 177 LBS | OXYGEN SATURATION: 91 % | SYSTOLIC BLOOD PRESSURE: 144 MMHG | DIASTOLIC BLOOD PRESSURE: 49 MMHG | BODY MASS INDEX: 28.45 KG/M2 | RESPIRATION RATE: 17 BRPM

## 2021-05-15 PROCEDURE — 77010033678 HC OXYGEN DAILY

## 2021-05-15 PROCEDURE — 74011250636 HC RX REV CODE- 250/636: Performed by: ORTHOPAEDIC SURGERY

## 2021-05-15 PROCEDURE — 97535 SELF CARE MNGMENT TRAINING: CPT

## 2021-05-15 PROCEDURE — 97162 PT EVAL MOD COMPLEX 30 MIN: CPT

## 2021-05-15 PROCEDURE — 74011250637 HC RX REV CODE- 250/637: Performed by: ORTHOPAEDIC SURGERY

## 2021-05-15 PROCEDURE — 97166 OT EVAL MOD COMPLEX 45 MIN: CPT

## 2021-05-15 PROCEDURE — 97530 THERAPEUTIC ACTIVITIES: CPT

## 2021-05-15 RX ORDER — FAMOTIDINE 20 MG/1
20 TABLET, FILM COATED ORAL DAILY
Status: DISCONTINUED | OUTPATIENT
Start: 2021-05-16 | End: 2021-05-15 | Stop reason: HOSPADM

## 2021-05-15 RX ADMIN — LISINOPRIL 2.5 MG: 5 TABLET ORAL at 08:50

## 2021-05-15 RX ADMIN — CLINDAMYCIN PHOSPHATE 900 MG: 900 INJECTION, SOLUTION INTRAVENOUS at 03:44

## 2021-05-15 RX ADMIN — LEVOTHYROXINE SODIUM 100 MCG: 0.1 TABLET ORAL at 07:42

## 2021-05-15 RX ADMIN — OXYCODONE HYDROCHLORIDE AND ACETAMINOPHEN 1 TABLET: 5; 325 TABLET ORAL at 07:42

## 2021-05-15 RX ADMIN — Medication 1 TABLET: at 08:50

## 2021-05-15 RX ADMIN — OXYCODONE HYDROCHLORIDE AND ACETAMINOPHEN 1 TABLET: 5; 325 TABLET ORAL at 01:03

## 2021-05-15 RX ADMIN — DOCUSATE SODIUM 50 MG AND SENNOSIDES 8.6 MG 1 TABLET: 8.6; 5 TABLET, FILM COATED ORAL at 08:50

## 2021-05-15 RX ADMIN — ASPIRIN 81 MG: 81 TABLET, COATED ORAL at 08:50

## 2021-05-15 RX ADMIN — OXYCODONE HYDROCHLORIDE AND ACETAMINOPHEN 1 TABLET: 5; 325 TABLET ORAL at 14:34

## 2021-05-15 RX ADMIN — FAMOTIDINE 40 MG: 20 TABLET ORAL at 08:50

## 2021-05-15 NOTE — PROGRESS NOTES
Progress Note      Patient: Amrit Nielsen               Sex: female          DOA: 5/14/2021     YOB: 1934      Age:  80 y.o.        LOS:  LOS: 0 days     Status Post: Procedure(s):  RIGHT ANKLE TIRMALLEOLAR ANKLE FRACTURE, OPEN REDUCTION THE FIBULA AND MEDIAL MALLEOLUS WITH C-ARM ([address])  Surgery Date: 5/14/2021            Subjective:     Amrit Nielsen is a 80 y.o. female who c/o right ankle pain reasonably well controlled with prn pain meds. Objective:      Visit Vitals  BP (!) 147/48 (BP 1 Location: Right upper arm, BP Patient Position: Lying; At rest)   Pulse 80   Temp 98 °F (36.7 °C)   Resp 17   Ht 5' 6.14\" (1.68 m)   Wt 80.3 kg (177 lb)   SpO2 93%   BMI 28.45 kg/m²       Physical Exam:   Dressing: splint in place  Wiggles Toes  Foot sensation intact to light touch      Intake and Output:  Current Shift:  05/14 1901 - 05/15 0700  In: 480 [P.O.:480]  Out: 750 [Urine:750]  Last three shifts:  05/13 0701 - 05/14 1900  In: 1600 [I.V.:1600]  Out: 1075 [Urine:1050]  Voiding Status:  + void without need for Ruelas catheter    Lab/Data Reviewed:  No results for input(s): HGB, HCT, NA, K, BUN, CREA, GLU, HGBEXT, HCTEXT in the last 72 hours. Medications Reviewed    Assessment/Plan     Active Problems:    Closed right trimalleolar fracture (5/14/2021)        · Discontinue Oxygen. · Change IV to Saline Lock. · Discharge Planning for home. · Non weight bearing to right lower extremity  · Pain Rx already sent to pharmacy. The patient was seen and examined by Dr. Rodrigo Vuong today as well and he is in agreement with above.

## 2021-05-15 NOTE — PROGRESS NOTES
Oral and Written notification given to patient and/or caregiver informing them that they are currently an Outpatient receiving care in our facility. Outpatient services include Observation Services. Care manager notified of patient discharge; called and patient informed CM that she lives at [place] [facility]; contacted facility at [phone] and spoke with charge nurse Lyle Hansen - they have a transport bus but no  for today so [facility] will need to arrange transport; verified info for discharge with [address] and requested that primary nurse contact Ms. Nahed Syed when transport has been arranged; other daughter will  Tramadol prescription per Ms. Nahed Syed. Noted recommendation for [facility] PT/OT services and offerred [facility]; [facility] and called to on call nurse.

## 2021-05-15 NOTE — PROGRESS NOTES
Problem: Self Care Deficits Care Plan (Adult)  Goal: *Acute Goals and Plan of Care (Insert Text)  Description: Occupational Therapy Goals  Initiated 5/15/2021 within 7 day(s). 1.  Patient will perform grooming with minimal assistance/contact guard assist standing at sink for 5 minutes or more while maintaining NWB at RLE. 2.  Patient will perform lower body dressing with minimal assistance/contact guard assist.  3.  Patient will perform bathing with minimal assistance/contact guard assist.  4.  Patient will perform toilet transfers with minimal assistance/contact guard assist.  5.  Patient will perform all aspects of toileting with minimal assistance/contact guard assist.  6.  Patient will participate in upper extremity therapeutic exercise/activities with supervision/set-up. 7.  Patient will utilize energy conservation techniques during functional activities with verbal, visual, and tactile cues. OCCUPATIONAL THERAPY EVALUATION    Patient: Jean Kamara (65 y.o. female)  Date: 5/15/2021  Primary Diagnosis: Closed right trimalleolar fracture [S82.851A]  Procedure(s) (LRB):  RIGHT ANKLE TIRMALLEOLAR ANKLE FRACTURE, OPEN REDUCTION THE FIBULA AND MEDIAL MALLEOLUS WITH C-ARM ([address]) (Right) 1 Day Post-Op   Precautions:   Fall, NWB(R LE)  PLOF: independent with ADLs and transfers     ASSESSMENT :  Based on the objective data described below, the patient presents with decreased strength, endurance and balance for safety with ADLs and transfers while maintaining NW at RLE following above mentioned surgical procedure. Pt presented sitting EOB, pleasantly confused and required cues to maintain NWB at RLE with transfers. Participate with sit<>stand transfers with Uzair, bed<>bsc transfers with min A, toileting and lashaun care with min-mod A and LB dressing with mod A during this session. Required constant verbal and tactile cues to maintain NWB at RLE with transfers and ADLs.  Pt inconsistent about living situation and getting help at [facility] and states they staff on site to assist with ADLs as needed. Per PT, pt's niece will be able to help pt with ADLs upon d/c. Pt was left seated at EOB with RLE elevated to prevent edema. Patient will benefit from skilled intervention to address the above impairments. Patient's rehabilitation potential is considered to be Good  Factors which may influence rehabilitation potential include:   []             None noted  []             Mental ability/status  [x]             Medical condition  [x]             Home/family situation and support systems  [x]             Safety awareness  []             Pain tolerance/management  []             Other:      PLAN :  Recommendations and Planned Interventions:   [x]               Self Care Training                  [x]      Therapeutic Activities  [x]               Functional Mobility Training   []      Cognitive Retraining  [x]               Therapeutic Exercises           [x]      Endurance Activities  [x]               Balance Training                    []      Neuromuscular Re-Education  []               Visual/Perceptual Training     [x]      Home Safety Training  [x]               Patient Education                   [x]      Family Training/Education  []               Other (comment):    Frequency/Duration: Patient will be followed by occupational therapy 1-2 times per day/4-7 days per week to address goals. Discharge Recommendations: Home Health  Further Equipment Recommendations for Discharge: N/A     SUBJECTIVE:   Patient stated  I have all the equipments at home from my .     OBJECTIVE DATA SUMMARY:     Past Medical History:   Diagnosis Date    Diabetes ([address]     diet controlled    GERD (gastroesophageal reflux disease)     Hypertension     Osteoporosis     Thyroid disease     hypo     Past Surgical History:   Procedure Laterality Date    HX APPENDECTOMY  1950    HX CHOLECYSTECTOMY  1990     Barriers to Learning/Limitations: yes;  altered mental status (i.e.Sedation, Confusion)  Compensate with: visual, verbal, tactile, kinesthetic cues/model    Home Situation:   Home Situation  Home Environment: [place] [address] Name: Kranthi hamilton on the [facility]  One/Two Story Residence: (has elevator)  Living Alone: Yes  Support Systems: Assisted living, Family member(s)  Patient Expects to be Discharged to[de-identified] Independent living facility  Current DME Used/Available at Home: [address], [address], straight, Shower chair, Wheelchair  Tub or Shower Type: Shower  []  Right hand dominant   []  Left hand dominant    Cognitive/Behavioral Status:  Neurologic State: Alert  Orientation Level: Oriented X4  Cognition: Follows commands;Poor safety awareness  Safety/Judgement: Fall prevention    Skin: intact  Edema: none    Vision/Perceptual:    Tracking: Able to track stimulus in all quadrants w/o difficulty    Corrective Lenses: Glasses  Coordination: BUE  Coordination: Within functional limits  Fine Motor Skills-Upper: Left Intact; Right Intact    Gross Motor Skills-Upper: Left Intact; Right Intact  Balance:  Sitting: Intact  Standing: Impaired; With support  Standing - Static: Fair  Standing - Dynamic : Fair;Poor  Strength: BUE  Strength: Generally decreased, functional  Tone & Sensation: BUE  Sensation: Intact  Range of Motion: BUE  AROM: Generally decreased, functional  Functional Mobility and Transfers for ADLs:  Bed Mobility:    Transfers:  Sit to Stand: Minimum assistance; Additional time;Assist x1  Stand to Sit: Contact guard assistance;Minimum assistance; Additional time;Assist x1   Toilet Transfer : Minimum assistance; Additional time;Assist x1; Moderate assistance(bed<>bsc)  ADL Assessment:   Feeding: Independent    Oral Facial Hygiene/Grooming: Supervision    Upper Body Dressing: Supervision    Lower Body Dressing: Moderate assistance    Toileting:  Moderate assistance;Minimum assistance  ADL Intervention:  Toileting  Toileting Assistance: Moderate assistance  Bladder Hygiene: Moderate assistance;Minimum assistance  Clothing Management: Moderate assistance  Cues: Verbal cues provided(to maintain NWB at RLE )    Cognitive Retraining  Safety/Judgement: Fall prevention    Therapeutic Exercise:  HEP provided for BUEs to maintain UE strength and overall endurance for carryover of ADLs and transfers with safety. Shoulder flexion/extension, ab/adduction; elbow flexion/extension; scapular elevation/deperession, protraction/retration: 2 sets 10 reps; 3 times a day  Pt demo understanding for the same. Pain:  Pain level pre-treatment: 8/10   Pain level post-treatment: 8/10   Pain Intervention(s): Medication (see MAR); Rest, Ice, Repositioning   Response to intervention: Nurse notified, See doc flow    Activity Tolerance:   Fair   Please refer to the flowsheet for vital signs taken during this treatment. After treatment:   [x] Patient left in no apparent distress sitting up in chair with RLE elevated  [] Patient left in no apparent distress in bed  [x] Call bell left within reach  [x] Nursing notified  [] Caregiver present  [] Bed alarm activated    COMMUNICATION/EDUCATION:   [x] Role of Occupational Therapy in the acute care setting  [x] Home safety education was provided and the patient/caregiver indicated understanding. [x] Patient/family have participated as able in goal setting and plan of care. [x] Patient/family agree to work toward stated goals and plan of care. [] Patient understands intent and goals of therapy, but is neutral about his/her participation. [] Patient is unable to participate in goal setting and plan of care. Thank you for this referral.  Genevieve Hernandez OTR/L  Time Calculation: 21 mins    Eval Complexity: History: MEDIUM Complexity : Expanded review of history including physical, cognitive and psychosocial  history ;    Examination: MEDIUM Complexity : 3-5 performance deficits relating to physical, cognitive , or psychosocial skils that result in activity limitations and / or participation restrictions; Decision Making:MEDIUM Complexity : Patient may present with comorbidities that affect occupational performnce.  Miniml to moderate modification of tasks or assistance (eg, physical or verbal ) with assesment(s) is necessary to enable patient to complete evaluation

## 2021-05-15 NOTE — ROUTINE PROCESS
Bedside and Verbal shift change report given to LEONARD Hobson RN (oncoming nurse) by JESS Matthew RN (offgoing nurse).  Report included the following information SBAR, Kardex, Intake/Output, MAR and Recent Results

## 2021-05-15 NOTE — OP NOTES
[facility]  [facility]    Name:  Rupinder Bhatti  MR#:   [id number]  :  1934  ACCOUNT #:  [de-identified]  DATE OF SERVICE:  2021    PREOPERATIVE DIAGNOSIS:  Right trimalleolar ankle fracture. POSTOPERATIVE DIAGNOSIS:  Right trimalleolar ankle fracture. PROCEDURE PERFORMED:  Open reduction and internal fixation of trimalleolar ankle fracture without fixation of posterior lip. SURGEON:  Diana Melissa MD    ASSISTANT:  Karen Mcneil. ANESTHESIA:  General plus popliteal block. COMPLICATIONS:  None. DRAINS:  None. SPECIMENS REMOVED:  none    IMPLANTS:  Arthrex FibuLock nail and 4.0 cannulated screws. ESTIMATED BLOOD LOSS:  25ml    INDICATIONS:  The patient was seen in the Emergency Room at [facility] for walking down stairs in her slippers when she slipped, fell, sustaining a closed trimalleolar ankle fracture. She had a complicated medical history of not only being 87 but thyroid, hypertension, diabetes, and reflux. We discussed her activity levels. She actually does most of her stuff seated and does a lot of bead work, and cognitively, she is very witty but does not do a lot of walking for exercise. On reviewing her examination, her skin had multiple blisters at the preoperative visit. Our plan was to fix this with minimally invasive surgery. PROCEDURE:  She was marked preoperatively, underwent a block by Anesthesia after being taken to the operating room. She was prepped and draped with the Betadine-Betadine prep. We cleaned the blisters. Most of these were healing underneath with epithelization underneath these blisters. We still used the Betadine-Betadine prep. After this, we used large C-arm and used a reduction clamp and made a small incision on both sides and used the reduction clamp to reduce the fibular fracture by pulling, internally rotating, and holding the clamp.   After this, we placed a guidewire from the Arthrex fibular nail and particularly the fibular going up to center-center position. I hand drilled the distal aspect of the fibula with a 6.2 reamer because of her soft bone quality. We made an incision, freed down the soft tissue, placed the soft tissue retractor down, and then placed the drill, first drill up and then the second drill up. I then placed the small Arthrex fibular nail. This was placed up until it was seated. We checked the distal aspect of this. We then deployed the fins. We placed a distal screw to hold this in place, placed the syndesmotic screw across to help with stabilization, and then placed 2.7 screws from anterior to posterior as well as lateral to medial.  This appeared to hold the fibula up to length and reduced the syndesmosis. In the medial clear space, we then percutaneously placed two 4.0 screws. These were placed from anterior to posterior, little bit on the anterior aspect of the medial malleolus. There was a small gap in the anterior aspect of the medial malleolus, but overall, reduction appeared to be anatomic and stable to fixation. We washed these small percutaneous holes out with normal saline with Betadine. We closed them with simple nylon sutures. The extended incisions for the screws and for the nails were also washed out and closed with nylon. She was placed with Xeroform over these wounds as well as Adaptic to cover the blisters that had not fully epithelialized underneath and placed a soft dressing and then into a three-sided bulky Cam splint. She was extubated and transferred to recovery room. She was admitted overnight for three doses of antibiotics and she will be nonweightbearing.       MD DEBBIE Sherwood/[email or web address]  D:  05/14/2021 17:23  T:  05/14/2021 22:44  JOB #:  [id number]

## 2021-05-15 NOTE — PROGRESS NOTES
Problem: Mobility Impaired (Adult and Pediatric)  Goal: *Acute Goals and Plan of Care (Insert Text)  Description: Physical Therapy short term goals initiated 05/15/21, to be achieved in 2 days. While maintaining R LE NWB, pt will:   1. Roll L and R with S in prep for repositioning in bed and pressure relief. 2. Transition supine <> sit with S in prep for OOB mobility. 3. Perform sit <> stand transfers with SBA and RW in prep for functional transfers. 4. Perform stand pivot transfer with RW and SBA in prep for w/c transfers. Note:     PHYSICAL THERAPY EVALUATION    Patient: Jamal Vuong (83 y.o. female)  Date: 5/15/2021  Primary Diagnosis: Closed right trimalleolar fracture [S82.851A]  Procedure(s) (LRB):  RIGHT ANKLE TIRMALLEOLAR ANKLE FRACTURE, OPEN REDUCTION THE FIBULA AND MEDIAL MALLEOLUS WITH C-ARM (ARTHREX) (Right) 1 Day Post-Op   Precautions:  Fall, NWB(R LE)  NWB  PLOF: Pt lives in [address] [place] [address], was indep with mobility PTA. ASSESSMENT :  Based on the objective data described below, the patient presents with decr L LE strength and ROM, decr balance, and decr activity tolerance resulting in deficits in overall functional mobility including bed mobility, transfers, and gait. Pt supine in bed on PT entry, denied R foot pain, eager to get dressed. Pt educated on NWB status of R LE, transitioned to sitting EOB with SBA and additional time. Assisted pt to dress sitting EOB. Pt performed sit <> stand transfer with Uzair, vc to maintain R LE NWB. Pt moved up along bedside with RW, pivoting on L foot, Uzair, and vc for sequencing and safety. Pt returned to sitting. 2 additional stand trials with steps at EOB and transfer to [facility], pt with improved performance and decr need for assist, progressed to CGA with additional trials. Educated pt on safe set-up for w/c transfers and toilet transfers. Pt endorses that her niece will be coming to stay with her and assist her on return to ILF.  Pt left sitting EOB with all needs met and all questions answered, educated on home safety, energy conservation, NWB status of R LE. Patient will benefit from skilled intervention to address the above impairments. Patient's rehabilitation potential is considered to be Fair  Factors which may influence rehabilitation potential include:   []         None noted  []         Mental ability/status  []         Medical condition  []         Home/family situation and support systems  [x]         Safety awareness  []         Pain tolerance/management  []         Other:      PLAN :  Recommendations and Planned Interventions:   [x]           Bed Mobility Training             []    Neuromuscular Re-Education  [x]           Transfer Training                   []    Orthotic/Prosthetic Training  [x]           Gait Training                          [x]    Modalities  [x]           Therapeutic Exercises           []    Edema Management/Control  [x]           Therapeutic Activities            [x]    Family Training/Education  [x]           Patient Education  []           Other (comment):    Frequency/Duration: Patient will be followed by physical therapy twice daily to address goals. Discharge Recommendations: Home Health  Further Equipment Recommendations for Discharge: N/A     SUBJECTIVE:   Patient stated I feel okay.     OBJECTIVE DATA SUMMARY:     Past Medical History:   Diagnosis Date    Diabetes ([address]     diet controlled    GERD (gastroesophageal reflux disease)     Hypertension     Osteoporosis     Thyroid disease     hypo     Past Surgical History:   Procedure Laterality Date    HX APPENDECTOMY  1950    HX CHOLECYSTECTOMY  1990     Barriers to Learning/Limitations: None  Compensate with: N/A  Home Situation:  Home Situation  Home Environment: Independent living  [address] Name: Lashonda Linn on the [facility]  One/Two Story Residence: (has elevator)  Living Alone: Yes  Support Systems: Assisted living, Family member(s)  Patient Expects to be Discharged to[de-identified] Independent living facility  Current DME Used/Available at Home: [place], straight, Shower chair, Wheelchair  Tub or Shower Type: Shower  Critical Behavior:  Neurologic State: Alert  Orientation Level: Oriented X4  Cognition: Follows commands;Poor safety awareness  Safety/Judgement: Fall prevention  Psychosocial  Patient Behaviors: Calm;Confused; Cooperative  Purposeful Interaction: Yes  Pt Identified Daily Priority: Clinical issues (comment)  Caritas Process: Nurture loving kindness;Establish trust;Teaching/learning; Attend basic human needs;Create healing environment  Caring Interventions: Reassure; Therapeutic modalities  Reassure: Therapeutic listening; Informing;Story tellings;Caring rounds  Therapeutic Modalities: Humor; Intentional therapeutic touch  Skin Condition/Temp: Warm;Dry  Skin Integrity: Incision (comment)  Skin Integumentary  Skin Color: Appropriate for ethnicity  Skin Condition/Temp: Warm;Dry  Skin Integrity: Incision (comment)  Turgor: Non-tenting  Hair Growth: Present  Strength:    Strength: Generally decreased, functional  Tone & Sensation:   Sensation: Intact  Range Of Motion:  AROM: Generally decreased, functional  Functional Mobility:  Bed Mobility:  Supine to Sit: Supervision; Additional time  Scooting: Supervision  Transfers:  Sit to Stand: Minimum assistance; Additional time;Assist x1  Stand to Sit: Contact guard assistance;Minimum assistance; Additional time;Assist x1  Balance:   Sitting: Intact  Standing: Impaired; With support  Standing - Static: Fair  Standing - Dynamic : Fair;Poor  Ambulation/Gait Training:  Distance (ft): 3 Feet (ft)(x3 trials)  Assistive Device: Gait belt;Walker, rolling  Gait Abnormalities: Decreased step clearance(pivoting on L LE)  Base of Support: Shift to left  Stance: Weight shift  Speed/Marcela: Slow  Step Length: Left shortened  Interventions: Safety awareness training; Tactile cues; Verbal cues  Pain:  Pain level pre-treatment: 0/10   Pain level post-treatment: 0/10   Pain Intervention(s) : Medication (see MAR); Rest, Ice, Repositioning  Response to intervention: Nurse notified, See doc flow  Activity Tolerance:   Pt with decr tolerance to OOB mobility d/t R LE NWB, limited to stand pivot transfers and lateral steps/pivots at EOB. Please refer to the flowsheet for vital signs taken during this treatment. After treatment:   []         Patient left in no apparent distress sitting up in chair  [x]         Patient left in no apparent distress in bed  [x]         Call bell left within reach  [x]         Nursing notified  []         Caregiver present  []         Bed alarm activated  []         SCDs applied    COMMUNICATION/EDUCATION:   [x]         Role of Physical Therapy in the acute care setting. [x]         Fall prevention education was provided and the patient/caregiver indicated understanding. [x]         Patient/family have participated as able in goal setting and plan of care. [x]         Patient/family agree to work toward stated goals and plan of care. []         Patient understands intent and goals of therapy, but is neutral about his/her participation. []         Patient is unable to participate in goal setting/plan of care: ongoing with therapy staff.  []         Other:     Thank you for this referral.  Jo Quick   Time Calculation: 28 mins      Eval Complexity: History: MEDIUM  Complexity : 1-2 comorbidities / personal factors will impact the outcome/ POC Exam:MEDIUM Complexity : 3 Standardized tests and measures addressing body structure, function, activity limitation and / or participation in recreation  Presentation: MEDIUM Complexity : Evolving with changing characteristics  Clinical Decision Making:Medium Complexity    Overall Complexity:MEDIUM

## 2021-05-15 NOTE — PROGRESS NOTES
7867 - Pain 8/10. PRN 1 tablet 5-325 percocet pain medication administered at this time. Patient has been educated on side effects. 9944- Patient in bed at this time. A/O x 4. IV to right FA  intact and patent. SCD to LLE. Cast padding and ACE dressing to RLE CDI. Kristaneiie denies numbness/tingling. Popliteal pulses palpable on LRE, pedal and popliteal pulsese palpable on LLE. . Lungs clear. Bowel sounds active to all quadrants. Patient able to get to 1100 on the incentive spirometer. Pain 8/10.     1126-Call Ilene Osorio to let her know when patient is leaving, daughter is picking up medications. [facility], given report to Sharron. Address for [facility] the [facility]: [address]     1301-Gave report to Sharron at [facility]. Informed her transport leaves at 3:00 PM.     1302-Ilene Osorio in room with patient, she is aware that patient is leaving with transport at 1500 and reported that a family member is picking up the patient's pain med prescription and dropping it off at [facility]. 1400-Patient states \"my toes are turning black\" on the operative side. This nurse assessed color and pulse, patient had full sensation and color matched other foot's toes. Pedal pulse present. Showed patient that toes were same color. 1534 - Pain 8/10. PRN  1 tablet PRN percocet pain medication administered at this time. Patient has been educated on side effects. 1600-Updated family that transport will come at 65.     1745-Updated family that [facility] states transportation will be another 20-30 minutes. 1810-Updated POA that patient leaving with transport at this time.

## 2021-05-15 NOTE — PROGRESS NOTES
Pharmacy Dosing Services: Famotidine    Pharmacist Renal Dosing  Note for Famotidine  Physician: Dr. Eloise Loja    Indication: Symptomatic GERD  Previous Regimen Famotidine 40mg PO daily   Serum Creatinine Lab Results   Component Value Date/Time    Creatinine 1.16 05/06/2021 12:11 PM      Creatinine Clearance Estimated Creatinine Clearance: 36.6 mL/min (by C-G formula based on SCr of 1.16 mg/dL). BUN Lab Results   Component Value Date/Time    BUN 25 (H) 05/06/2021 12:11 PM         Famotidine 40mg PO daily renally adjusted to Famotidine 20mg PO daily per [facility] P&T protocol. Pharmacy will monitor daily and make necessary adjustments based on clinical status.     Kathy Melchor, PharmD  [phone]

## 2021-05-15 NOTE — PROGRESS NOTES
Problem: Falls - Risk of  Goal: *Absence of Falls  Description: Document Bree Caro Fall Risk and appropriate interventions in the flowsheet. Outcome: Progressing Towards Goal  Note: Fall Risk Interventions:  Mobility Interventions: Patient to call before getting OOB         Medication Interventions: Teach patient to arise slowly, Patient to call before getting OOB, Evaluate medications/consider consulting pharmacy         History of Falls Interventions:  Investigate reason for fall

## 2023-01-01 NOTE — ED NOTES
ED Tech at bedside placing splint.
I have reviewed discharge instructions with the patient. The patient verbalized understanding. Arm band placed in shred it  Daughter will drive her back to [facility] she is aware no weight bearing and she will  Have hem bring out a wheel chair to take her in she is aware of splint do not remove until seen by orthoo.  Was shown how to check for neurovascular
Telephone call to Erin Faulkner LPN she states the daughter never even told her she was taken out of the building. She will need to arrange for wheel chair she asked that the order be sent with the patient in the mean time they have extra wheels chairs she can use. She is no weight bearing until evaluated by ortho.  Antolin Perez will send prescription to pharmacy [facility] on [facility]
DISPLAY PLAN FREE TEXT

## (undated) DEVICE — GARMENT,MEDLINE,DVT,INT,CALF,MED, GEN2: Brand: MEDLINE

## (undated) DEVICE — BIT DRL DIA2.6MM CANN FOR ANK FRAC MGMT SYS

## (undated) DEVICE — STERILE POLYISOPRENE POWDER-FREE SURGICAL GLOVES: Brand: PROTEXIS

## (undated) DEVICE — BNDG,ELSTC,MATRIX,STRL,6"X5YD,LF,HOOK&LP: Brand: MEDLINE

## (undated) DEVICE — PADDING CAST W4INXL4YD ST COT COHESIVE HND TEARABLE SPEC

## (undated) DEVICE — Device

## (undated) DEVICE — SPONGE LAP 18X18IN STRL -- 5/PK

## (undated) DEVICE — ZIMMER® STERILE DISPOSABLE TOURNIQUET CUFF WITH PROTECTIVE SLEEVE AND PLC, SINGLE PORT, SINGLE BLADDER, 34 IN. (86 CM)

## (undated) DEVICE — PACK PROCEDURE SURG EXTREMITY CUST

## (undated) DEVICE — BANDAGE COMPR W4INXL10YD WHITE/BEIGE E MTRX HK LOOP CLSR

## (undated) DEVICE — SUTURE COAT VCRL SZ 4-0 L18IN ABSRB UD L19MM PS-2 1/2 CIR J496G

## (undated) DEVICE — DRAPE C-ARMOUR C-ARM KIT --

## (undated) DEVICE — SCR BNE CORT LP NLCK 3.5X12MM -- SS
Type: IMPLANTABLE DEVICE | Site: ANKLE | Status: NON-FUNCTIONAL
Removed: 2021-05-14

## (undated) DEVICE — SUT ETHLN 3-0 18IN PS1 BLK --

## (undated) DEVICE — DRESSING,GAUZE,XEROFORM,CURAD,1"X8",ST: Brand: CURAD

## (undated) DEVICE — 3-0 COATED VICRYL PLUS UNDYED 1X27" SH --

## (undated) DEVICE — DRAPE XR C ARM 41X74IN LF --

## (undated) DEVICE — TOWEL,OR,DSP,ST,BLUE,STD,4/PK,20PK/CS: Brand: MEDLINE

## (undated) DEVICE — PREP SKN CHLRAPRP APL 26ML STR --